# Patient Record
Sex: FEMALE
[De-identification: names, ages, dates, MRNs, and addresses within clinical notes are randomized per-mention and may not be internally consistent; named-entity substitution may affect disease eponyms.]

---

## 2022-02-18 PROBLEM — Z00.00 ENCOUNTER FOR PREVENTIVE HEALTH EXAMINATION: Status: ACTIVE | Noted: 2022-02-18

## 2022-03-01 ENCOUNTER — APPOINTMENT (OUTPATIENT)
Dept: PEDIATRIC ORTHOPEDIC SURGERY | Facility: CLINIC | Age: 22
End: 2022-03-01
Payer: COMMERCIAL

## 2022-03-01 DIAGNOSIS — M41.129 ADOLESCENT IDIOPATHIC SCOLIOSIS, SITE UNSPECIFIED: ICD-10-CM

## 2022-03-01 PROCEDURE — 72082 X-RAY EXAM ENTIRE SPI 2/3 VW: CPT

## 2022-03-01 PROCEDURE — 99072 ADDL SUPL MATRL&STAF TM PHE: CPT

## 2022-03-01 PROCEDURE — 99204 OFFICE O/P NEW MOD 45 MIN: CPT | Mod: 25

## 2022-03-01 PROCEDURE — 99214 OFFICE O/P EST MOD 30 MIN: CPT | Mod: 25

## 2022-03-03 NOTE — PHYSICAL EXAM
[FreeTextEntry1] : General: Healthy appearing 21 year -old young woman  \par Psych:  The patient is awake, alert and in no acute distress.  \par HEENT: Normal appearing eyes, lips, ears, nose.  \par Integumentary: Skin in warm, pink, well perfused\par Chest: Good respiratory effort with no audible wheezing without use of a stethoscope.\par Gait: Ambulates independently into the room with no evidence of antalgia. Patient is able to get on and off examination table without difficulty.\par Neurology: Good coordination and balance.\par Musculoskeletal:\par Spine:\par Inspection of the skin reveals no cafe au lait spots or large birth marks.\par From behind, patient is well centered with head and shoulders appropriately aligned with pelvis. \par Left shoulder higher. Flank is asymmetric with deeper left curve.\par On Hari's Forward Bend, there is a right sided thoracic prominence \par NTTP over spinous processes and paraspinal musculature.\par Full range of motion at cervical, thoracic and lumbar spine with no pain or difficulty.\par Absent abdominal reflexes in all quadrants\par Symmetric patellar reflexes \par

## 2022-03-03 NOTE — DATA REVIEWED
[de-identified] : My review and interpretation of the radiologic studies:\par Xray of spine, 2 views 3/1/22: 28 degree thoracic curve, 14 degree thoracolumbar curve.  Growth plates closed.  The disc heights are maintained.  Sagittal alignment is maintained.  Coronal balance is maintained.  There are no vertebral abnormalities that were noted.\par

## 2022-03-03 NOTE — ASSESSMENT
[FreeTextEntry1] : 21 year old young woman with scoliosis and back pain \par \par I discussed Carleen's xrays and exam with her.  She has a 28 degree scoliosis and is fully skeletally mature.  There is a low risk that her curve will progress further.  As for her back pain, I am recommending a course of physical therapy to address core and back strength, prescription provided today.   There are no activity restrictions.  If her pain does not improve after 6-8 weeks of PT she should come back for further evaluation.  All questions addressed, family agrees with plan of care.\par \par I, Giovanna Hall PA-C, have acted as scribe and documented the above for Dr. Ramirez \par \par The above documentation completed by the scribe is an accurate record of both my words and actions.\par

## 2022-03-03 NOTE — REASON FOR VISIT
[Initial Evaluation] : an initial evaluation [Patient] : patient [FreeTextEntry1] : scoliosis, back pain

## 2023-11-24 NOTE — HISTORY OF PRESENT ILLNESS
Ana María has a skin infection around the nail (paronychia with cellulitis).     You should take the antibiotics as prescribed.     Also, take ibuprofen or acetaminophen if needed.  More importantly, make sure to soak the affected nail in either epsom salts or baking soda water at least three times daily to allow the nail to soften up and grow out past the skin.  Trim your nails straight across and not back at an angle.    [FreeTextEntry1] : Carleen is a 21 year old young woman who comes to evaluate scoliosis.  She reports she was diagnosed as a teenager, and was treated with a TLSO but did not really ever use it.  She has not been seen for her scoliosis in a few years.  She reports constant low and mid back pain, with no aggravating or alleviating factors.   No lower extremity paresthesias or weakness.  She has gone to a chiropractor with limited relief, she has not yet tried physical therapy.  She is otherwise well and not limited in her ADLs.  Here for initial evaluation for her scoliosis.